# Patient Record
Sex: MALE | Race: WHITE | URBAN - METROPOLITAN AREA
[De-identification: names, ages, dates, MRNs, and addresses within clinical notes are randomized per-mention and may not be internally consistent; named-entity substitution may affect disease eponyms.]

---

## 2021-12-02 ENCOUNTER — HOSPITAL ENCOUNTER (EMERGENCY)
Age: 36
Discharge: HOME | End: 2021-12-02
Attending: EMERGENCY MEDICINE
Payer: MEDICAID

## 2021-12-02 ENCOUNTER — APPOINTMENT (OUTPATIENT)
Dept: GENERAL RADIOLOGY | Age: 36
End: 2021-12-02
Attending: PHYSICIAN ASSISTANT
Payer: MEDICAID

## 2021-12-02 VITALS
RESPIRATION RATE: 18 BRPM | TEMPERATURE: 98.2 F | HEART RATE: 77 BPM | WEIGHT: 220.9 LBS | DIASTOLIC BLOOD PRESSURE: 97 MMHG | OXYGEN SATURATION: 100 % | SYSTOLIC BLOOD PRESSURE: 122 MMHG

## 2021-12-02 DIAGNOSIS — S61.211A LACERATION OF LEFT INDEX FINGER, FOREIGN BODY PRESENCE UNSPECIFIED, NAIL DAMAGE STATUS UNSPECIFIED, INITIAL ENCOUNTER: Primary | ICD-10-CM

## 2021-12-02 PROCEDURE — 74011250636 HC RX REV CODE- 250/636: Performed by: PHYSICIAN ASSISTANT

## 2021-12-02 PROCEDURE — 90715 TDAP VACCINE 7 YRS/> IM: CPT | Performed by: PHYSICIAN ASSISTANT

## 2021-12-02 PROCEDURE — 73140 X-RAY EXAM OF FINGER(S): CPT

## 2021-12-02 PROCEDURE — 90471 IMMUNIZATION ADMIN: CPT

## 2021-12-02 PROCEDURE — 99281 EMR DPT VST MAYX REQ PHY/QHP: CPT

## 2021-12-02 PROCEDURE — 75810000293 HC SIMP/SUPERF WND  RPR

## 2021-12-02 RX ORDER — LIDOCAINE HYDROCHLORIDE AND EPINEPHRINE 20; 10 MG/ML; UG/ML
5 INJECTION, SOLUTION INFILTRATION; PERINEURAL
Status: DISCONTINUED | OUTPATIENT
Start: 2021-12-02 | End: 2021-12-02

## 2021-12-02 RX ORDER — LIDOCAINE HYDROCHLORIDE AND EPINEPHRINE 10; 10 MG/ML; UG/ML
1.5 INJECTION, SOLUTION INFILTRATION; PERINEURAL
Status: DISCONTINUED | OUTPATIENT
Start: 2021-12-02 | End: 2021-12-02 | Stop reason: HOSPADM

## 2021-12-02 RX ADMIN — TETANUS TOXOID, REDUCED DIPHTHERIA TOXOID AND ACELLULAR PERTUSSIS VACCINE, ADSORBED 0.5 ML: 5; 2.5; 8; 8; 2.5 SUSPENSION INTRAMUSCULAR at 16:34

## 2021-12-02 NOTE — DISCHARGE INSTRUCTIONS
Please come back to the emergency room or go to PCP or urgent care center to have the sutures removed in 7 to 10 days. There is a small but potential chance of retained foreign body with any laceration if you have any worsening fevers, purulent drainage, increasing pain laceration please come back to the emergency room.      Please

## 2021-12-02 NOTE — ED PROVIDER NOTES
EMERGENCY DEPARTMENT HISTORY AND PHYSICAL EXAM          Date: 12/2/2021  Patient Name: Lindy Li      History of Presenting Illness     Chief Complaint   Patient presents with    Laceration     Pt arrives ambulatory to triage with CC of lac to L index finger from chain saw. Bleeding controlled at this time. History Provided By: Patient    HPI: Lindy Li is a 39 y.o. male Right handed, no prior medical hx, sx including left flank lipoma removed 1 year ago, present after laceration to left index finger from chain saw at 1pm, while doing yard work Last tetanus unknown but remote. Cleaned wound with hydrogen peroxide immediately after sustaining laceration, and wrapped in bandage. Upon arrival was hemostatic      PCP: Patti, MD Yanni    There are no other complaints, changes, or physical findings at this time. Past History     Past Medical History:  History reviewed. No pertinent past medical history. Past Surgical History:  History reviewed. No pertinent surgical history. Family History:  History reviewed. No pertinent family history. Social History:  Social History     Tobacco Use    Smoking status: Not on file    Smokeless tobacco: Not on file   Substance Use Topics    Alcohol use: Not on file    Drug use: Not on file       Allergies: Allergies   Allergen Reactions    Alcohol Shortness of Breath and Swelling         Review of Systems   Review of Systems   Constitutional: Negative for chills and fever. Skin: Positive for wound. Negative for rash. Neurological: Negative for dizziness and numbness. All other systems reviewed and are negative. Physical Exam   Physical Exam  Constitutional:       General: He is not in acute distress. Appearance: He is normal weight. HENT:      Head: Normocephalic and atraumatic. Nose: Nose normal.      Mouth/Throat:      Mouth: Mucous membranes are moist.   Eyes:      Extraocular Movements: Extraocular movements intact. Conjunctiva/sclera: Conjunctivae normal.   Cardiovascular:      Rate and Rhythm: Normal rate and regular rhythm. Pulses: Normal pulses. Heart sounds: Normal heart sounds. Pulmonary:      Effort: Pulmonary effort is normal.      Breath sounds: Normal breath sounds. Abdominal:      General: Abdomen is flat. Musculoskeletal:         General: Normal range of motion. Cervical back: Normal range of motion and neck supple. Skin:     General: Skin is warm. Comments: Linear laceration over left index finger dorsal side 2cm long, hemostatic, no underlying muscle or tendon exposed. ROM intact, sensation intact, cap refill <2 in index finger    Neurological:      General: No focal deficit present. Mental Status: He is alert. Mental status is at baseline. Diagnostic Study Results     Labs -   No results found for this or any previous visit (from the past 12 hour(s)). Radiologic Studies -   XR 2ND FINGER LT MIN 2 V   Final Result      No fracture or radiodense foreign body. CT Results  (Last 48 hours)    None        CXR Results  (Last 48 hours)    None            Medical Decision Making   I am the first provider for this patient. I reviewed the vital signs, available nursing notes, past medical history, past surgical history, family history and social history. Vital Signs-Reviewed the patient's vital signs. Patient Vitals for the past 12 hrs:   Temp Pulse Resp BP SpO2   12/02/21 1331 98.2 °F (36.8 °C) 77 18 (!) 122/97 100 %       Records Reviewed: Nursing Notes and Old Medical Records    Provider Notes (Medical Decision Making):   DDx: laceration     79-year-old male with no significant past medical history, right-handed male presenting with laceration to the left hand index finger sustained at 1 PM from a chainsaw. Tdap will be updated here, laceration currently hemostatic on examination, sutures placed x440 nonabsorbable.   X-ray unremarkable for bony abnormality or foreign body. Return precautions given to patient, discussed that patient should return if having any worsening fevers chills purulent drainage from laceration and chance of foreign body retention. Patient told to go to any urgent care, PCP or come back to the ED in 7 to 10 days for suture removal.    ED Course and Progress Notes:   Initial assessment performed. The patients presenting problems have been discussed, and they are in agreement with the care plan formulated and outlined with them. I have encouraged them to ask questions as they arise throughout their visit. Diagnosis     Clinical Impression:   1. Laceration of left index finger, foreign body presence unspecified, nail damage status unspecified, initial encounter        Disposition:  home    DISCHARGE PLAN:    1. There are no discharge medications for this patient. 2.   Follow-up Information     Follow up With Specialties Details Why Contact Info    [facility] EMERGENCY DEPT Emergency Medicine In 1 week For suture removal [address] [place] [address]  [phone]        3.  Return to ED if worse         Resident Signature: Yusef Choi MD PGY4 EM/IM Resident